# Patient Record
Sex: FEMALE | Race: OTHER | Employment: STUDENT | URBAN - METROPOLITAN AREA
[De-identification: names, ages, dates, MRNs, and addresses within clinical notes are randomized per-mention and may not be internally consistent; named-entity substitution may affect disease eponyms.]

---
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
## 2025-01-04 DIAGNOSIS — E66.01 CLASS 3 SEVERE OBESITY DUE TO EXCESS CALORIES WITH SERIOUS COMORBIDITY AND BODY MASS INDEX (BMI) OF 45.0 TO 49.9 IN ADULT (HCC): ICD-10-CM

## 2025-01-04 DIAGNOSIS — E66.813 CLASS 3 SEVERE OBESITY DUE TO EXCESS CALORIES WITH SERIOUS COMORBIDITY AND BODY MASS INDEX (BMI) OF 45.0 TO 49.9 IN ADULT (HCC): ICD-10-CM

## 2025-01-04 NOTE — TELEPHONE ENCOUNTER
Endocrine Refill protocol for weight management    Protocol Criteria:  PASSED     If below requirement is met, send a 90-day supply with 1 refill per provider protocol.    Verify appointment with Endocrinology completed in the last 6 months or scheduled in the next 3 months.    Last completed office visit:9/26/2024 Laina Bailey MD     Next scheduled Follow up: No future appointments. - [employer] message sent to schedule appointment.

## 2025-01-08 RX ORDER — PHENTERMINE HYDROCHLORIDE 37.5 MG/1
37.5 TABLET ORAL
Qty: 30 TABLET | Refills: 2 | Status: SHIPPED | OUTPATIENT
Start: 2025-01-08

## 2025-05-01 DIAGNOSIS — E66.813 CLASS 3 SEVERE OBESITY DUE TO EXCESS CALORIES WITH SERIOUS COMORBIDITY AND BODY MASS INDEX (BMI) OF 45.0 TO 49.9 IN ADULT: ICD-10-CM

## 2025-05-01 DIAGNOSIS — E55.9 VITAMIN D DEFICIENCY: ICD-10-CM

## 2025-05-01 RX ORDER — PHENTERMINE HYDROCHLORIDE 37.5 MG/1
37.5 TABLET ORAL
Qty: 30 TABLET | Refills: 2 | Status: SHIPPED | OUTPATIENT
Start: 2025-05-01

## 2025-05-01 RX ORDER — ERGOCALCIFEROL 1.25 MG/1
50000 CAPSULE ORAL
Qty: 6 CAPSULE | Refills: 1 | Status: SHIPPED | OUTPATIENT
Start: 2025-05-01

## 2025-05-01 NOTE — TELEPHONE ENCOUNTER
Endocrine Refill protocol for Ergocalciferol     Protocol Criteria: FAILED Reason: Abnormal labs    If all below requirements are met, send a 90-day supply with 1 refill per provider protocol.    Verify appointment with Endocrinology completed in the last 6 months or scheduled in the next 3 months.  Vitamin D level must have been completed within the last 6 months  Vitamin D level must be within the normal range     Vitamin D, 25OH, Total (ng/mL)   Date Value   08/07/2024 25.5 (L)    (32.0-100.0)    Last completed office visit:9/26/2024 Laina Bailey MD   Next scheduled Follow up:   Future Appointments   Date Time Provider Department Center   9/4/2025  2:40 PM Laina Bailey MD [facility] [place]

## (undated) NOTE — LETTER
Date & Time: 10/13/2019, 5:04 PM  Patient: Katelyn Tracy  Encounter Provider(s):    VINCENT Hernandez       To Whom It May Concern:    Katelyn Tracy was seen and treated in our department on 10/13/2019.  She should not participate in gym/sports

## (undated) NOTE — ED AVS SNAPSHOT
Filippo Ramirez   MRN: [id number]    Department:  [facility] Emergency Department   Date of Visit:  10/18/2017           Disclosure     Insurance plans vary and the physician(s) referred by the ER may not be covered by your plan.  Please contact CARE PHYSICIAN AT ONCE OR RETURN IMMEDIATELY TO THE EMERGENCY DEPARTMENT. If you have been prescribed any medication(s), please fill your prescription right away and begin taking the medication(s) as directed.   If you believe that any of the medications

## (undated) NOTE — LETTER
October 18, 2017    Patient: Lyn Del Rio   Date of Visit: 10/18/2017       To Whom It May Concern:    Lyn Del Rio was seen and treated in our emergency department on 10/18/2017. She should not participate in gym/sports until 10/22/17.     If you

## (undated) NOTE — ED AVS SNAPSHOT
Phuc Holly   MRN: [id number]    Department:  [facility] Emergency Department   Date of Visit:  10/13/2019           Disclosure     Insurance plans vary and the physician(s) referred by the ER may not be covered by your plan.  Please contact CARE PHYSICIAN AT ONCE OR RETURN IMMEDIATELY TO THE EMERGENCY DEPARTMENT. If you have been prescribed any medication(s), please fill your prescription right away and begin taking the medication(s) as directed.   If you believe that any of the medications